# Patient Record
Sex: MALE | Race: BLACK OR AFRICAN AMERICAN | NOT HISPANIC OR LATINO
[De-identification: names, ages, dates, MRNs, and addresses within clinical notes are randomized per-mention and may not be internally consistent; named-entity substitution may affect disease eponyms.]

---

## 2022-08-12 ENCOUNTER — DASHBOARD ENCOUNTERS (OUTPATIENT)
Age: 57
End: 2022-08-12

## 2022-08-12 ENCOUNTER — OFFICE VISIT (OUTPATIENT)
Dept: URBAN - METROPOLITAN AREA CLINIC 118 | Facility: CLINIC | Age: 57
End: 2022-08-12
Payer: COMMERCIAL

## 2022-08-12 ENCOUNTER — LAB OUTSIDE AN ENCOUNTER (OUTPATIENT)
Dept: URBAN - METROPOLITAN AREA CLINIC 118 | Facility: CLINIC | Age: 57
End: 2022-08-12

## 2022-08-12 VITALS
BODY MASS INDEX: 26.86 KG/M2 | DIASTOLIC BLOOD PRESSURE: 102 MMHG | HEIGHT: 68 IN | SYSTOLIC BLOOD PRESSURE: 162 MMHG | HEART RATE: 87 BPM | TEMPERATURE: 97.7 F | WEIGHT: 177.2 LBS

## 2022-08-12 DIAGNOSIS — R05.9 COUGH, UNSPECIFIED TYPE: ICD-10-CM

## 2022-08-12 DIAGNOSIS — R12 HEARTBURN: ICD-10-CM

## 2022-08-12 PROBLEM — 49727002: Status: ACTIVE | Noted: 2022-08-12

## 2022-08-12 PROCEDURE — 99244 OFF/OP CNSLTJ NEW/EST MOD 40: CPT | Performed by: INTERNAL MEDICINE

## 2022-08-12 PROCEDURE — 99204 OFFICE O/P NEW MOD 45 MIN: CPT | Performed by: INTERNAL MEDICINE

## 2022-08-12 NOTE — HPI-TODAY'S VISIT:
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  He has been on AM PPI and PM famotidine x 1 month, with moderate relief.  Since mid 5/2022, pt has been having problems with breathing, and cough.  Continues to have nocturnal cough.  Hospitalized 2x at [facility], last in 6/2022.  Seen by Pulmonary there, dx'd with mixed acute respiratory failure and asthma.  Pt was smoking until 1st admission in 5/2022.  Since discharge, pt saw an outpatient Pulmonary MD in [place], and was told he was normal.  Pt had ENT evaluation for post-nasal drainage, which has improved, and reflux changes were noted. Pt used to have intermittent lower substernal burning discomfort 1-3x/wk, resolved with OTC Zantac.  No symptoms x 3-4 months.  No regurgitation.  No dysphagia.  No weight loss. BMs usu 2x/d, but has been going 7-8x/d, x several weeks, loose, minimal.  Has urgency.  No GI bleed.  No clear nocturnal symptoms.

## 2022-08-16 PROBLEM — 16331000: Status: ACTIVE | Noted: 2022-08-12

## 2022-08-31 ENCOUNTER — OFFICE VISIT (OUTPATIENT)
Dept: URBAN - METROPOLITAN AREA MEDICAL CENTER 16 | Facility: MEDICAL CENTER | Age: 57
End: 2022-08-31